# Patient Record
Sex: MALE | Race: WHITE | NOT HISPANIC OR LATINO | URBAN - NONMETROPOLITAN AREA
[De-identification: names, ages, dates, MRNs, and addresses within clinical notes are randomized per-mention and may not be internally consistent; named-entity substitution may affect disease eponyms.]

---

## 2023-05-22 ENCOUNTER — OFFICE (OUTPATIENT)
Dept: URBAN - NONMETROPOLITAN AREA CLINIC 1 | Facility: CLINIC | Age: 53
End: 2023-05-22
Payer: COMMERCIAL

## 2023-05-22 VITALS
SYSTOLIC BLOOD PRESSURE: 123 MMHG | HEART RATE: 74 BPM | HEIGHT: 72 IN | DIASTOLIC BLOOD PRESSURE: 67 MMHG | WEIGHT: 217 LBS

## 2023-05-22 DIAGNOSIS — K50.814 CROHN'S DISEASE OF BOTH SMALL AND LARGE INTESTINE WITH ABSCE: ICD-10-CM

## 2023-05-22 PROCEDURE — 99205 OFFICE O/P NEW HI 60 MIN: CPT | Performed by: INTERNAL MEDICINE

## 2023-05-22 PROCEDURE — 36415 COLL VENOUS BLD VENIPUNCTURE: CPT | Performed by: INTERNAL MEDICINE

## 2023-05-27 LAB
ACUTE HEPATITIS: HBSAG SCREEN: NEGATIVE
ACUTE HEPATITIS: HCV AB: NON REACTIVE
ACUTE HEPATITIS: HEP A AB, IGM: NEGATIVE
ACUTE HEPATITIS: HEP B CORE AB, IGM: NEGATIVE
CBC, PLATELET, NO DIFFERENTIAL: HEMATOCRIT: 34.6 % — LOW (ref 37.5–51)
CBC, PLATELET, NO DIFFERENTIAL: HEMOGLOBIN: 10.5 G/DL — LOW (ref 13–17.7)
CBC, PLATELET, NO DIFFERENTIAL: MCH: 23.9 PG — LOW (ref 26.6–33)
CBC, PLATELET, NO DIFFERENTIAL: MCHC: 30.3 G/DL — LOW (ref 31.5–35.7)
CBC, PLATELET, NO DIFFERENTIAL: MCV: 79 FL (ref 79–97)
CBC, PLATELET, NO DIFFERENTIAL: NRBC: (no result)
CBC, PLATELET, NO DIFFERENTIAL: PLATELETS: 433 X10E3/UL (ref 150–450)
CBC, PLATELET, NO DIFFERENTIAL: RBC: 4.4 X10E6/UL (ref 4.14–5.8)
CBC, PLATELET, NO DIFFERENTIAL: RDW: 15.6 % — HIGH (ref 11.6–15.4)
CBC, PLATELET, NO DIFFERENTIAL: WBC: 7.7 X10E3/UL (ref 3.4–10.8)
COMP. METABOLIC PANEL (14): A/G RATIO: 1.1 — LOW (ref 1.2–2.2)
COMP. METABOLIC PANEL (14): ALBUMIN: 3.6 G/DL — LOW (ref 3.8–4.9)
COMP. METABOLIC PANEL (14): ALKALINE PHOSPHATASE: 92 IU/L (ref 44–121)
COMP. METABOLIC PANEL (14): ALT (SGPT): 19 IU/L (ref 0–44)
COMP. METABOLIC PANEL (14): AST (SGOT): 21 IU/L (ref 0–40)
COMP. METABOLIC PANEL (14): BILIRUBIN, TOTAL: 0.6 MG/DL (ref 0–1.2)
COMP. METABOLIC PANEL (14): BUN/CREATININE RATIO: 9 (ref 9–20)
COMP. METABOLIC PANEL (14): BUN: 9 MG/DL (ref 6–24)
COMP. METABOLIC PANEL (14): CALCIUM: 9.1 MG/DL (ref 8.7–10.2)
COMP. METABOLIC PANEL (14): CARBON DIOXIDE, TOTAL: 23 MMOL/L (ref 20–29)
COMP. METABOLIC PANEL (14): CHLORIDE: 101 MMOL/L (ref 96–106)
COMP. METABOLIC PANEL (14): CREATININE: 0.95 MG/DL (ref 0.76–1.27)
COMP. METABOLIC PANEL (14): EGFR: 95 ML/MIN/1.73 (ref 59–?)
COMP. METABOLIC PANEL (14): GLOBULIN, TOTAL: 3.2 G/DL (ref 1.5–4.5)
COMP. METABOLIC PANEL (14): GLUCOSE: 124 MG/DL — HIGH (ref 70–99)
COMP. METABOLIC PANEL (14): POTASSIUM: 3.8 MMOL/L (ref 3.5–5.2)
COMP. METABOLIC PANEL (14): PROTEIN, TOTAL: 6.8 G/DL (ref 6–8.5)
COMP. METABOLIC PANEL (14): SODIUM: 138 MMOL/L (ref 134–144)
IBD EXPANDED PANEL: ACCA: 25 UNITS (ref 0–90)
IBD EXPANDED PANEL: ALCA: 7 UNITS (ref 0–60)
IBD EXPANDED PANEL: AMCA: 34 UNITS (ref 0–100)
IBD EXPANDED PANEL: ATYPICAL PANCA: NEGATIVE
IBD EXPANDED PANEL: COMMENTS: (no result)
IBD EXPANDED PANEL: GASCA: 47 UNITS (ref 0–50)
INTERPRETATION: (no result)
QUANTIFERON-TB GOLD PLUS: NEGATIVE
QUANTIFERON-TB GOLD PLUS: QUANTIFERON CRITERIA: (no result)
QUANTIFERON-TB GOLD PLUS: QUANTIFERON INCUBATION: (no result)
QUANTIFERON-TB GOLD PLUS: QUANTIFERON MITOGEN VALUE: >10 IU/ML
QUANTIFERON-TB GOLD PLUS: QUANTIFERON NIL VALUE: 0.02 IU/ML
QUANTIFERON-TB GOLD PLUS: QUANTIFERON TB1 AG VALUE: 0.03 IU/ML
QUANTIFERON-TB GOLD PLUS: QUANTIFERON TB2 AG VALUE: 0.02 IU/ML
THIOPURINE METHYLTRANSFERASE: INTERPRETATION: (no result)
THIOPURINE METHYLTRANSFERASE: METHODOLOGY: (no result)
THIOPURINE METHYLTRANSFERASE: TPMT ACTIVITY: 23.8 UNITS/ML RBC

## 2023-09-11 ENCOUNTER — OFFICE (OUTPATIENT)
Dept: URBAN - NONMETROPOLITAN AREA CLINIC 1 | Facility: CLINIC | Age: 53
End: 2023-09-11

## 2023-09-11 VITALS
SYSTOLIC BLOOD PRESSURE: 108 MMHG | HEIGHT: 72 IN | DIASTOLIC BLOOD PRESSURE: 76 MMHG | HEART RATE: 66 BPM | WEIGHT: 217 LBS

## 2023-09-11 DIAGNOSIS — D50.0 IRON DEFICIENCY ANEMIA SECONDARY TO BLOOD LOSS (CHRONIC): ICD-10-CM

## 2023-09-11 DIAGNOSIS — K50.814 CROHN'S DISEASE OF BOTH SMALL AND LARGE INTESTINE WITH ABSCE: ICD-10-CM

## 2023-09-11 PROCEDURE — 99214 OFFICE O/P EST MOD 30 MIN: CPT | Performed by: INTERNAL MEDICINE

## 2023-12-28 ENCOUNTER — ON CAMPUS - OUTPATIENT (OUTPATIENT)
Dept: URBAN - NONMETROPOLITAN AREA HOSPITAL 34 | Facility: HOSPITAL | Age: 53
End: 2023-12-28

## 2023-12-28 DIAGNOSIS — D50.9 IRON DEFICIENCY ANEMIA, UNSPECIFIED: ICD-10-CM

## 2023-12-28 DIAGNOSIS — K50.90 CROHN'S DISEASE, UNSPECIFIED, WITHOUT COMPLICATIONS: ICD-10-CM

## 2023-12-28 PROCEDURE — 43235 EGD DIAGNOSTIC BRUSH WASH: CPT | Mod: 51 | Performed by: INTERNAL MEDICINE

## 2023-12-28 PROCEDURE — 45380 COLONOSCOPY AND BIOPSY: CPT | Performed by: INTERNAL MEDICINE

## 2024-01-29 ENCOUNTER — OFFICE (OUTPATIENT)
Dept: URBAN - NONMETROPOLITAN AREA CLINIC 1 | Facility: CLINIC | Age: 54
End: 2024-01-29
Payer: COMMERCIAL

## 2024-01-29 DIAGNOSIS — D50.9 IRON DEFICIENCY ANEMIA, UNSPECIFIED: ICD-10-CM

## 2024-01-29 DIAGNOSIS — K50.90 CROHN'S DISEASE, UNSPECIFIED, WITHOUT COMPLICATIONS: ICD-10-CM

## 2024-01-29 PROCEDURE — 36415 COLL VENOUS BLD VENIPUNCTURE: CPT | Performed by: INTERNAL MEDICINE

## 2024-04-01 ENCOUNTER — OFFICE (OUTPATIENT)
Dept: URBAN - NONMETROPOLITAN AREA CLINIC 1 | Facility: CLINIC | Age: 54
End: 2024-04-01
Payer: COMMERCIAL

## 2024-04-01 VITALS
HEART RATE: 71 BPM | HEIGHT: 72 IN | DIASTOLIC BLOOD PRESSURE: 90 MMHG | WEIGHT: 227 LBS | SYSTOLIC BLOOD PRESSURE: 128 MMHG

## 2024-04-01 DIAGNOSIS — D50.0 IRON DEFICIENCY ANEMIA SECONDARY TO BLOOD LOSS (CHRONIC): ICD-10-CM

## 2024-04-01 DIAGNOSIS — K50.814 CROHN'S DISEASE OF BOTH SMALL AND LARGE INTESTINE WITH ABSCE: ICD-10-CM

## 2024-04-01 PROCEDURE — 99214 OFFICE O/P EST MOD 30 MIN: CPT | Performed by: NURSE PRACTITIONER

## 2024-04-01 PROCEDURE — 36415 COLL VENOUS BLD VENIPUNCTURE: CPT | Performed by: NURSE PRACTITIONER

## 2024-04-01 NOTE — SERVICENOTES
He has not been on his iron supplement regularly, did not realize he had a refill.  He is going to get back on the daily dosing.  IV supplementation can be considered if he does not respond to oral iron.  
 Next office visit in 6 months, we will discuss colonoscopy to check for endoscopic remission.

## 2024-04-01 NOTE — SERVICEHPINOTES
This is a 54-year-old male with a history of Crohn's disease, managed by Dr. Rivera.     He has a history of Crohn's ileocolitis requiring numerous colon surgeries, and chronic perianal fistulas.   He comes in today for a three-month follow-up after starting Remicade home infusions every 8 weeks.  He is feeling much better. Has 3-4 formed BM's daily, no bloody stools or abdominal pain.  His only medications are Remicade, Januvia, iron, and a probiotic.br

brLast EGD and colonoscopy 12/28/23 by Dr. Rivera-
br-EGD Findings:brEsophagus: 	Normal esophagus. GE junction, Z-line and diaphragm at 40 centimeters. No evidence of Cuenca's esophagus or hiatal hernia.brStomach: Normal stomach. No evidence of gastritis, ulcers, cancers or tumors.brPylorus: Normal. Widely patent.brDuodenum: Normal to 4th portion of the duodenum.
br-Colon Findings: There was deep serpiginous Crohn's ulcers throughout the colon. Multiple biopsies were taken. The patient has had an extended right hemicolectomy. Ileocolonic anastomosis was at 45 centimeters. The neoterminal ileum had a few scattered superficial Crohn's ulcers. Multiple biopsies were taken.
brRecommendations: The patient has severe Crohn's disease in the colon as well as mild disease in the terminal ileum. Will follow up the biopsy results. Will start prednisone, Flagyl and Imuran. Will need to start biological therapy. Will start Remicade. br   
br -Final Pathologic Diagnosis:br  1.  RANDOM COLON, ENDOSCOPIC BIOPSY:br   Focal active colitis, moderate to marked, in association with mucosal ulceration, consistent with active Crohn's colitis.br   Detached fragments of fibrinopurulent exudate and granulation tissue are present, consistent with ulcer bed.br   Negative for adenomatous change.br2.  TERMINAL ILEUM, ENDOSCOPIC BIOPSY:br   Focal active ileitis/enteritis, moderate to marked, in association with mucosal ulceration, consistent with active Crohn's ileitis/enteritis.br   Detached fragments of fibrinopurulent exudate and granulation tissue are present, consistent with ulcer bed.br   Negative for adenomatous change.
br
brPast medical history:br 6/5/2014 perianal abscess anal rectal fissure status post fistulotomy and abscess drainage with seton placement Dr. Mohr 8/8/2014 anal fistula placement of anal fistula plug by Dr. Mohr 10/9/2014 healed perianal fistula with no large perirectal abscess, anal fissure exam under anesthesia by Dr. Mohr 5/4/2017 incision and drainage of perianal abscess with anoscopy and fistulotomy by Dr. Cheung 8/18/2017 complex perirectal abscess with perianal fissure status post incision and drainage with anoscopy by Dr. Cheung 10/26/2017 anal fistula status post repair with plug by Dr. Cheung 11/16/2017 segmental colectomy with colocolonic anastomosis of the left colon with loop ileostomy with I&ampD of abdominal wall abscess pathology revealing perforated diverticulitis with microabscess Dr. 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 Mejia pathology revealing extracolonic acute inflammation with hemorrhage and scarbr Colonoscopy via colostomy 6/8/2018 by Dr. Hernández showed erosions in the distal colon up to 15 cm to the ostomy, diversion colitis in the Perez's pouch with some polyps in the Perez's pouch, hemorrhoids. I do not see any comment on the ileum. Pathology: Erosion shows active focal mild colitis, rectal polyps prolapse inflammatory polyp. Nothing specific.br 6/25/2018 drainage of intersphincteric fistula by Dr. Cheung 12/7/2018 anoscopy with rectal mass lesions pathology revealed benign colonic tissue and scarbr 3/8/2019:Colonoscopy colostomy 3/8/2019 by Dr Jain showed multiple deep ulcerations in the colon interspersed with normal colon with ulcers in the terminal ileum pathology revealing active ileitis and colitis with crypt architectural distortion consistent with probable Crohn's disease 3/11/2019 attempted laparoscopic open colectomy with colorectal anastomosis with diverting ileostomy by Dr. Mejia pathology revealing colonic parenchyma with multifocal area of transmucosal submucosal ulceration and erosion 12/17/2019 enteroscopy through ostomy showed no ileitis with a possible fistula, proctoscopy showed a polyp in the distal rectum. Biopsies of fistula confirmed fistula formation with small erosion. Polyp in the rectum was inflammatory. 2/6/2020 ileostomy takedown by Dr. Mejia pathology revealing benign enterocutaneous junction with acute on chronic inflammation and scar.br 8/23/2021 Dr. Mejia incisional hernia repair x2 with mesh Colonoscopy 12/14/2022 showed a patent but mildly strictured anastomosis at 65 cm biopsies suggestive of some inflammation. There is also some rectal ulcers and some colitis at 50 cm which were not biopsied. This was done by Dr. Hernández. IBD first step panel 2019 consistent with laboratory bowel disease but inconclusive for Crohn's disease versus ulcerative colitis. Dr Tucker Medication history:br Remicade infusion started in 2019 by Dr. Hernández Labs on 4/24/2023 showed a hemoglobin low at 11.6 with MCV of 74 point. The metabolic profile is normal.

## 2024-11-21 PROBLEM — K63.89 OTHER SPECIFIED DISEASES OF INTESTINE: Status: ACTIVE | Noted: 2024-11-21

## 2025-04-07 ENCOUNTER — OFFICE (OUTPATIENT)
Dept: URBAN - NONMETROPOLITAN AREA CLINIC 1 | Facility: CLINIC | Age: 55
End: 2025-04-07

## 2025-04-07 VITALS
DIASTOLIC BLOOD PRESSURE: 92 MMHG | SYSTOLIC BLOOD PRESSURE: 125 MMHG | HEART RATE: 73 BPM | WEIGHT: 232 LBS | HEIGHT: 72 IN

## 2025-04-07 DIAGNOSIS — K50.90 CROHN'S DISEASE, UNSPECIFIED, WITHOUT COMPLICATIONS: ICD-10-CM

## 2025-04-07 PROCEDURE — 99213 OFFICE O/P EST LOW 20 MIN: CPT | Performed by: NURSE PRACTITIONER
